# Patient Record
Sex: FEMALE | Race: WHITE | NOT HISPANIC OR LATINO | Employment: UNEMPLOYED | URBAN - METROPOLITAN AREA
[De-identification: names, ages, dates, MRNs, and addresses within clinical notes are randomized per-mention and may not be internally consistent; named-entity substitution may affect disease eponyms.]

---
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
## 2025-04-02 ENCOUNTER — OFFICE VISIT (OUTPATIENT)
Dept: PLASTIC SURGERY | Facility: CLINIC | Age: 20
End: 2025-04-02

## 2025-04-02 DIAGNOSIS — M95.0 ACQUIRED DEFORMITY OF NOSE: Primary | ICD-10-CM

## 2025-04-02 NOTE — LETTER
4/2/2025       RE: Anabel Colin  [address] [address]     Dear Colleague,    Thank you for referring your patient, Anabel Colin, to the [facility] at [facility]. Please see a copy of my visit note below.    Rachael is in to see us today in follow-up of her rhinoplasty.  She and I both like the profile and tip projection and rotation are wonderful improvement as is the dorsal contour.  However on frontal view the anterior spine is shifted left carrying the posterior septal angle with that the dorsum has a subtle curvature to the left with some fullness and then depression of the right mid vault she does not have airway problems but this acquired deformity would benefit from correction.  I would file an irregularity on the left side of the rhinion he can carry out osteotomies to reposition vault and mobilized the anterior nasal spine will see if her insurance would cover this I am not convinced they would and we would then get back to her afterwards and we can make appropriate plans following that surgery I think would require the surgery center.  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  She does not have breathing difficulties.  We would check with the insurance company to see if they would cover repair of the acquired deformity.  Will then get back to her.         Again, thank you for allowing me to participate in the care of your patient.       Sincerely,    KARLA DELACRUZ MD

## 2025-04-02 NOTE — LETTER
April 2, 2025  Re: Anabel Colin  2005    Dear Dr. White,    Thank you so much for referring Anabel Colin to the [facility]. I had the pleasure of visiting with Anabel today.     Attached you will find a copy of my note. Please feel free to reach out to me with any questions, [phone].     Rachael is in to see us today in follow-up of her rhinoplasty.  She and I both like the profile and tip projection and rotation are wonderful improvement as is the dorsal contour.  However on frontal view the anterior spine is shifted left carrying the posterior septal angle with that the dorsum has a subtle curvature to the left with some fullness and then depression of the right mid vault she does not have airway problems but this acquired deformity would benefit from correction.  I would file an irregularity on the left side of the rhinion he can carry out osteotomies to reposition vault and mobilized the anterior nasal spine will see if her insurance would cover this I am not convinced they would and we would then get back to her afterwards and we can make appropriate plans following that surgery I think would require the surgery center.  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  She does not have breathing difficulties.  We would check with the insurance company to see if they would cover repair of the acquired deformity.  Will then get back to her.           Your trust in our practice and care is much  appreciated.    Sincerely,  KARLA DELACRUZ MD

## 2025-04-02 NOTE — PROGRESS NOTES
Rachael is in to see us today in follow-up of her rhinoplasty.  She and I both like the profile and tip projection and rotation are wonderful improvement as is the dorsal contour.  However on frontal view the anterior spine is shifted left carrying the posterior septal angle with that the dorsum has a subtle curvature to the left with some fullness and then depression of the right mid vault she does not have airway problems but this acquired deformity would benefit from correction.  I would file an irregularity on the left side of the rhinion he can carry out osteotomies to reposition vault and mobilized the anterior nasal spine will see if her insurance would cover this I am not convinced they would and we would then get back to her afterwards and we can make appropriate plans following that surgery I think would require the surgery center.  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  She does not have breathing difficulties.  We would check with the insurance company to see if they would cover repair of the acquired deformity.  Will then get back to her.

## 2025-04-03 ENCOUNTER — PREP FOR PROCEDURE (OUTPATIENT)
Dept: PLASTIC SURGERY | Facility: CLINIC | Age: 20
End: 2025-04-03

## 2025-04-03 DIAGNOSIS — M95.0 ACQUIRED NASAL DEFORMITY: ICD-10-CM

## 2025-04-03 DIAGNOSIS — J34.89 NASAL OBSTRUCTION: Primary | ICD-10-CM

## 2025-04-03 NOTE — NURSING NOTE
-A surgical Case Request has been entered.  -Proposed Procedure: Rhinoplasty, secondary; Intermediate Revision (Bony Work with Osteotomies).  -Will now send an in-basket message to Parul Ro to start working on a Prior Auth.    Evangelina Paez RN  4/3/2025 1:45 PM

## 2025-05-07 ENCOUNTER — TELEPHONE (OUTPATIENT)
Dept: PLASTIC SURGERY | Facility: CLINIC | Age: 20
End: 2025-05-07

## 2025-05-07 NOTE — TELEPHONE ENCOUNTER
-Spoke to the patient's father, Mich, over the phone.    -Discussed the cosmetic quote for Rhinoplasty; Secondary: Intermediate Revision (Bony Work with Osteotomies) and Cadaveric Rib Graft. (see Media tab).    Discussed quote/GFE in detail with pt, including the fact that anesthesia and facility fees are an estimate based on time and may be subject to change. We also discussed that a non-refundable deposit of 50% of the surgeon's fee is collected at the time of scheduling, with the remaining surgeon's fee due three weeks prior to surgery.    Pt verbalized understanding of financial responsibility if he decides to pursue cosmetic surgery.    -The patient's father was going to review the quote, sign it and send it back to me, and also discuss with his daughter (patient) and family to see if they would like to proceed.    -The patient's father was told to contact our office if/ when ready to proceed with scheduling.    -Patient's dad (Mich' email): [email or web address]    Evangelina Paez RN  5/7/2025 4:28 PM

## 2025-05-07 NOTE — TELEPHONE ENCOUNTER
-A call was placed to Mich, the patient's father, but there was no answer.    -A voicemail message was left, asking him to return our call.    -Clinic call back number was provided & he was told to ask for Dr. Jarrod Hutchinson's nurse.    -When he calls us back, will go over the proposed surgical quote: Rhinoplasty; Secondary: Intermediate Revision (Bony Work with Osteotomies) and Cadaveric Rib Graft.    -Will also obtain his email so we can send him a copy & have him sign and send it back to us once reviewed.    -Will await his call back to discuss this all further.    Evangelina Paez RN  5/7/2025 3:12 PM      Statement Selected